# Patient Record
Sex: FEMALE | Race: WHITE
[De-identification: names, ages, dates, MRNs, and addresses within clinical notes are randomized per-mention and may not be internally consistent; named-entity substitution may affect disease eponyms.]

---

## 2018-03-29 ENCOUNTER — HOSPITAL ENCOUNTER (EMERGENCY)
Dept: HOSPITAL 17 - NEPK | Age: 48
Discharge: HOME | End: 2018-03-29
Payer: SELF-PAY

## 2018-03-29 VITALS
DIASTOLIC BLOOD PRESSURE: 73 MMHG | RESPIRATION RATE: 18 BRPM | TEMPERATURE: 99.9 F | OXYGEN SATURATION: 96 % | HEART RATE: 96 BPM | SYSTOLIC BLOOD PRESSURE: 126 MMHG

## 2018-03-29 VITALS — WEIGHT: 154.32 LBS | HEIGHT: 65 IN | BODY MASS INDEX: 25.71 KG/M2

## 2018-03-29 DIAGNOSIS — F41.9: ICD-10-CM

## 2018-03-29 DIAGNOSIS — J02.9: Primary | ICD-10-CM

## 2018-03-29 DIAGNOSIS — F32.9: ICD-10-CM

## 2018-03-29 DIAGNOSIS — F17.200: ICD-10-CM

## 2018-03-29 PROCEDURE — 99283 EMERGENCY DEPT VISIT LOW MDM: CPT

## 2018-03-29 NOTE — PD
HPI


Chief Complaint:  ENT Complaint


Time Seen by Provider:  13:45


Travel History


International Travel<30 days:  No


Contact w/Intl Traveler<30days:  No


Traveled to known affect area:  No





History of Present Illness


HPI


48-year-old female presents to the emergency department with complaint of sore 

throat since yesterday.  Says she thinks she has strep throat.  Says she sees 

white patches.  Denies lump in throat, difficulty swallowing, unusual drooling.

  Reports painful swallowing.  Reports fever of 100.3.  Denies cough.  Reports 

left ear pain.  Denies nasal congestion.  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  Has not taken any medication or try 

any treatments to alleviate her symptoms.  Rates pain 9/10.  Worse with 

swallowing.  Describes it as burning, stabbing sensation.  No known relieving 

factors.  No primary care provider.  Allergies to sulfa and hornets.  Denies 

significant past medical history.  Has no other medical complaints.  No other 

modifying factors or associated signs and symptoms.





PFSH


Past Medical History


Anxiety:  Yes


Depression:  Yes


Diabetes:  No


Diminished Hearing:  No


Seizures:  No


Pregnant?:  Not Pregnant


:  3


Para:  2


Miscarriage:  1


Tubal Ligation:  Yes (20 YRS AGO)





Past Surgical History


Appendectomy:  Yes





Social History


Alcohol Use:  Yes


Tobacco Use:  Yes (1 ppd)


Substance Use:  Yes (ALCOHOL DAILY, MARIJUANA OCCASIONALLY)





Allergies-Medications


(Allergen,Severity, Reaction):  


Coded Allergies:  


     hornet venom (Unverified  Allergy, Severe, Hives, 3/29/18)


     Sulfa (Sulfonamide Antibiotics) (Unverified  Allergy, Intermediate, Rash, 3

/29/18)


Reported Meds & Prescriptions





Reported Meds & Active Scripts


Active


Magic Mouthwash Pediatric/Adult Liq (Lidocaine/Diphenhydr/Alum/Mg/Simeth) 60 Ml 

Susp 5 Ml SWISH-SWAL Q3HR PRN


     Each 5mL contains: Diphenydramine 4.5mg,


     Viscous Lidocaine 2% 10mg,


     Maalox Advanced Regular Strength 2.7ml


Ibuprofen 800 Mg Tab 800 Mg PO Q6HR PRN


Amoxicillin 500 Mg Cap 500 Mg PO BID 10 Days








Review of Systems


Except as stated in HPI:  all other systems reviewed are Neg





Physical Exam


Narrative


GENERAL: Well-nourished, well-developed  femur patient, in no acute 

distress


SKIN: Warm and dry.  No rash.


HEAD: Atraumatic. Normocephalic. 


EYES: Pupils equal and round at 3 mm with brisk reaction. No scleral icterus. 

No injection or drainage.  PERRLA. 


ENT: Mucosa pink and dry.  Pharynx with 2+ tonsils; with erythema, exudate, and 

edema.  No uvular edema. No uvular, palatal, or tonsillar deviation.  Airway 

patent.  Voice is hoarse.


EARS: Bilateral pinnae and external canals appear within normal limits. 

Bilateral tympanic membranes without  erythema, dullness or perforation..


NECK: Trachea midline.  Anterior cervical lymphadenopathy and tenderness.  


CARDIOVASCULAR: Regular rate.


RESPIRATORY: No accessory muscle use.


GASTROINTESTINAL: Rounded.


MUSCULOSKELETAL: No obvious deformities. No clubbing.  No cyanosis.  No edema. 


NEUROLOGICAL: Awake and alert.  Oriented 3.  No obvious cranial nerve 

deficits.  Motor grossly within normal limits. Normal speech.  Moves all 

extremities. 


PSYCHIATRIC: Appropriate mood and affect; insight and judgment normal.





Data


Data


Last Documented VS





Vital Signs








  Date Time  Temp Pulse Resp B/P (MAP) Pulse Ox O2 Delivery O2 Flow Rate FiO2


 


3/29/18 13:18 99.9 96 18 126/73 (90) 96   








Orders





 Orders


Amoxicillin (Trimox) (3/29/18 14:00)


Ibuprofen (Motrin) (3/29/18 14:00)


Ed Discharge Order (3/29/18 13:55)








[facility]


Medical Decision Making


Medical Screen Exam Complete:  Yes


Emergency Medical Condition:  Yes


Medical Record Reviewed:  Yes


Differential Diagnosis


Strep pharyngitis, exudative pharyngitis, viral pharyngitis, less likely 

peritonsillar abscess


Narrative Course


48-year-old female with exudative pharyngitis.  Patient has low-grade fever of 

99.9 in the ER.  T-max of 100.3 at home.  Denies vomiting.  Denies lump in 

throat, difficulty swallowing, unusual drooling.  Amoxicillin, ibuprofen 

administered in the ER.  Amoxicillin, ibuprofen, Magic mouthwash prescribed for 

home.  Instructed patient to follow up with primary care provider.  Patient 

verbalizes understanding and agreement with treatment plan.  Patient is 

medically cleared and stable for discharge.  Discussed reasons to return to the 

emergency department.  Patient agrees with treatment plan.  The patients vital 

signs are stable and the patient is stable for outpatient follow-up and 

treatment.  Patient discharged home, stable and in no acute distress.





Diagnosis





 Primary Impression:  


 Exudative pharyngitis


Referrals:  


[facility]





Primary Care Physician


Patient Instructions:  General Instructions, Pharyngitis (ED), Strep Throat (ED)


Departure Forms:  Tests/Procedures, Work Release   Enter return to work date:  

Mar 31, 2018





***Additional Instructions:  


Take Antibiotics as prescribed and complete full course of antibiotics


Throw away and change your toothbrush 24 hours after starting antibiotics


Get plenty of sleep/rest


Rest your voice


Drink plenty of fluids to prevent dehydration


Use warm saltwater gargles to soothe throat pain


Use an air humidifier/turn off ceiling fans


Use throat lozenges as needed for sore throat


Use ibuprofen or acetaminophen as needed to relieve pain and fever


Follow-up with your primary care provider within 2-4 days


Return immediately to the emergency department with worsening of symptoms


***Med/Other Pt SpecificInfo:  Prescription(s) given


Scripts


Diphenhydramine-Lidocaine-Mag-Alum-Simeth Liq (Magic Mouthwash Pediatric/Adult 

Liq) 60 Ml Susp


5 ML SWISH-SWAL Q3HR Y for SORE THROAT, #60 ML 0 Refills


   Each 5mL contains: Diphenydramine 4.5mg,


   Viscous Lidocaine 2% 10mg,


   Maalox Advanced Regular Strength 2.7ml


   Prov: Katya Escamilla         3/29/18 


Ibuprofen (Ibuprofen) 800 Mg Tab


800 MG PO Q6HR Y for PAIN, #30 TAB 0 Refills


   Prov: Katya Escamilla         3/29/18 


Amoxicillin (Amoxicillin) 500 Mg Cap


500 MG PO BID for Infection for 10 Days, #20 CAP 0 Refills


   Prov: Katya Escamilla         3/29/18


Disposition:  01 DISCHARGE HOME


Condition:  Stable











Katya Escamilla Mar 29, 2018 13:54

## 2018-04-13 ENCOUNTER — HOSPITAL ENCOUNTER (EMERGENCY)
Dept: HOSPITAL 17 - NEPD | Age: 48
Discharge: HOME | End: 2018-04-13
Payer: SELF-PAY

## 2018-04-13 VITALS
RESPIRATION RATE: 18 BRPM | DIASTOLIC BLOOD PRESSURE: 68 MMHG | SYSTOLIC BLOOD PRESSURE: 135 MMHG | OXYGEN SATURATION: 98 % | TEMPERATURE: 97.4 F | HEART RATE: 83 BPM

## 2018-04-13 VITALS — WEIGHT: 149.91 LBS | HEIGHT: 64 IN | BODY MASS INDEX: 25.59 KG/M2

## 2018-04-13 DIAGNOSIS — F17.210: ICD-10-CM

## 2018-04-13 DIAGNOSIS — J03.91: Primary | ICD-10-CM

## 2018-04-13 PROCEDURE — 99283 EMERGENCY DEPT VISIT LOW MDM: CPT

## 2018-04-13 NOTE — PD
HPI


Chief Complaint:  Cold / Flu Symptoms


Time Seen by Provider:  09:20


Travel History


International Travel<30 days:  No


Contact w/Intl Traveler<30days:  No


Traveled to known affect area:  No





History of Present Illness


HPI


48-year-old  female presents emergency department with recurrent 

bilateral tonsillar swelling and pain which started yesterday.  Patient states 

she was recently seen here for similar problems on 3/29/2018 and given 

amoxicillin twice daily 10 days.  She states it seemed to improve and she was 

better when she stopped her meds 3 days ago.  Patient has history of recurrent 

sinus trouble





PFSH


Past Medical History


Anxiety:  Yes


Depression:  Yes


Diabetes:  No


Diminished Hearing:  No


Seizures:  No


Pregnant?:  Not Pregnant


:  3


Para:  2


Miscarriage:  1


Tubal Ligation:  Yes (20 YRS AGO)





Past Surgical History


Appendectomy:  Yes





Social History


Alcohol Use:  Yes


Tobacco Use:  Yes (1 ppd)


Substance Use:  Yes (ALCOHOL DAILY, MARIJUANA OCCASIONALLY)





Allergies-Medications


(Allergen,Severity, Reaction):  


Coded Allergies:  


     hornet venom (Unverified  Allergy, Severe, Hives, 18)


     Sulfa (Sulfonamide Antibiotics) (Unverified  Allergy, Intermediate, Rash, )


Reported Meds & Prescriptions





Reported Meds & Active Scripts


Active


Magic Mouthwash Pediatric/Adult Liq (Lidocaine/Diphenhydr/Alum/Mg/Simeth) 60 Ml 

Susp 5 Ml SWISH-SWAL Q3HR PRN


     Each 5mL contains: Diphenydramine 4.5mg,


     Viscous Lidocaine 2% 10mg,


     Maalox Advanced Regular Strength 2.7ml


Ibuprofen 800 Mg Tab 800 Mg PO Q6HR PRN


Amoxicillin 500 Mg Cap 500 Mg PO BID 10 Days








Review of Systems


Except as stated in HPI:  all other systems reviewed are Neg


General / Constitutional:  No: Fever


Eyes:  No: Visual changes


HENT:  Positive: Sore Throat, Congestion, No: Headaches, Vertigo, 

Lightheadedness, Rhinitis, Rhinorrhea, Nosebleed, Neck Stiffness, Neck Pain, 

Masses, Ear Discharge, Earache


Cardiovascular:  No: Chest Pain or Discomfort


Respiratory:  No: Shortness of Breath


Gastrointestinal:  No: Abdominal Pain


Genitourinary:  No: Dysuria


Musculoskeletal:  No: Pain


Skin:  No Rash


Neurologic:  No: Weakness


Psychiatric:  No: Depression


Endocrine:  No: Polydipsia


Hematologic/Lymphatic:  No: Easy Bruising





Physical Exam


Narrative


GENERAL: Patient appears in mild distress.


SKIN: Warm and dry.  Normal color.  Normal turgor.  No rash


HEAD: Atraumatic. Normocephalic. 


EYES: Pupils equal and round. No scleral icterus. No injection or drainage. 


ENT: No nasal bleeding or discharge.  Mucous membranes pink and moist.  TMs are 

clear bilaterally.  Patient has bilateral tonsillar swelling with exudate more 

on the left than the right.  Question of early abscess is noted on the left.  

Uvula is midline.  Airway appears intact and patent.  Patient is noted to have 

mild multiple voice. 


NECK: Trachea midline.  Neck is supple without significant lymphadenopathy or 

tenderness.


CARDIOVASCULAR: Regular rate and rhythm.  


RESPIRATORY: No accessory muscle use. Clear to auscultation. Breath sounds 

equal bilaterally. 


GASTROINTESTINAL: Abdomen soft, non-tender, nondistended. Hepatic and splenic 

margins not palpable. 


MUSCULOSKELETAL: Extremities without clubbing, cyanosis, or edema. No obvious 

deformities. 


NEUROLOGICAL: Awake and alert. No obvious cranial nerve deficits.  Motor 

grossly within normal limits. Five out of 5 muscle strength in the arms and 

legs.  Normal speech.


PSYCHIATRIC: Appropriate mood and affect; insight and judgment normal.





Data


Data


Last Documented VS





Vital Signs








  Date Time  Temp Pulse Resp B/P (MAP) Pulse Ox O2 Delivery O2 Flow Rate FiO2


 


18 08:59 97.4 83 18 135/68 (90) 98   








Orders





 Orders


Levofloxacin (Levaquin) (18 09:30)


Prednisone (Deltasone) (18 09:30)








[facility]


Medical Decision Making


Medical Screen Exam Complete:  Yes


Emergency Medical Condition:  Yes


Medical Record Reviewed:  Yes


Differential Diagnosis


Recurrent tonsillitis.  Early tonsillar abscess.  Possible need for 

tonsillectomy.


Narrative Course


Patient is felt to be medically stable at time of exam.


Patient is given Levaquin 750 mg p.o. now.


Patient is given 40 mg prednisone p.o. 


Radiographic imaging is not felt warranted at this time is on the history and 

physical.


Patient is continued on Levaquin 500 mg daily for 10 days.


Patient also continued on prednisone 20 mg daily for the next 5 days.


Patient is to use Tylenol and saltwater gargles and ice chips as needed


Patient is to return if symptoms do not continue to improve.


Follow-up with local ear nose and throat physician is recommended





Diagnosis





 Primary Impression:  


 Recurrent acute tonsillitis


Referrals:  


Rudy Alexander MD


Patient Instructions:  General Instructions





***Additional Instructions:  


Patient is given Levaquin 750 mg p.o. now.


Patient is given 40 mg prednisone p.o. 


Radiographic imaging is not felt warranted at this time is on the history and 

physical.


Patient is continued on Levaquin 500 mg daily for 10 days.


Patient also continued on prednisone 20 mg daily for the next 5 days.


Patient is to use Tylenol and saltwater gargles and ice chips as needed


Patient is to return if symptoms do not continue to improve.


Follow-up with local ear nose and throat physician is recommended


Scripts


Prednisone (Prednisone) 20 Mg Tab


20 MG PO DAILY for 5 Days, #5 TAB 0 Refills


   Prov: Jani Ambrocio MD         18 


Levofloxacin (Levaquin) 500 Mg Tablet


500 MG PO DAILY for Infection for 10 Days, #10 TAB 0 Refills


   Prov: Jani Ambrocio MD         18


Disposition:  01 DISCHARGE HOME


Condition:  Stable











Leo Elmore 2018 09:27